# Patient Record
(demographics unavailable — no encounter records)

---

## 2024-12-13 NOTE — RISK ASSESSMENT
[No, patient denies ideation or behavior] : No, patient denies ideation or behavior [No] : No [FreeTextEntry8] : No past attempts or SI.

## 2024-12-13 NOTE — PLAN
[FreeTextEntry2] : Problem 1 Reduce anxious mood due to adjustments\par  Goal 1 Identify 2-3 skills that help manage anxiety and increase confidence of being able to manage emotions from 2 to 7.5 on scale (10=being most confident).\par  \par  Problem 2 Improve interpersonal relationships given themes from previous trauma such a trust, intimacy and self-esteem\par  Goal 2 Identify 2-3 skills to improve ability to communicate needs in relationship that she uses to elicit self-confidence and improve intimacy and trust.  [Cognitive and/or Behavior Therapy] : Cognitive and/or Behavior Therapy  [de-identified] : Session focused on assessing symptoms and treatment plan. Client noted worry, anxiety and stress related to recent relationship and transition to her job which has brings up themes related to trust and past trauma. Client processed thoughts and feelings related to stressors. She shared how she has used skills from previous sessions but struggles making decisions about her relationship. Socratic questions were used in session to think about aspects of leaving or staying in the relationship. She agreed to practice skills from previous sessions to manage stress.  Agreed to meet following holiday or sooner depending on patients and writer's schedule.  [Recommended Frequency of Visits: ____] : Recommended frequency of visits: [unfilled] [Return in ____ week(s)] : Return in [unfilled] week(s) [FreeTextEntry1] : Continue with treatment plan and appointments. Will discuss frequency in next session.

## 2024-12-13 NOTE — REASON FOR VISIT
[Patient preference] : as per patient preference [Other Location: e.g. Home (Enter Location, City,State)___] : The provider was located at [unfilled]. [Home] : The patient, [unfilled], was located at home, [unfilled], at the time of the visit. [Verbal consent obtained from patient/other participant(s)] : Verbal consent for telehealth/telephonic services obtained from patient/other participant(s) [FreeTextEntry4] : 9:05 [FreeTextEntry5] : 10:00 [FreeTextEntry1] : Continue with treatment plan.

## 2024-12-18 NOTE — END OF VISIT
[Duration of Psychotherapy Visit (minutes spent in synchronous communication): ____] : Duration of Psychotherapy Visit (minutes spent in synchronous communication): [unfilled] [Teletherapy Service Provided] : The services provided in this session were delivered via tele-therapy [Individual Psychotherapy for 16-37 minutes] : Individual Psychotherapy for 16-37 minutes

## 2024-12-18 NOTE — PLAN
[Cognitive and/or Behavior Therapy] : Cognitive and/or Behavior Therapy  [Recommended Frequency of Visits: ____] : Recommended frequency of visits: [unfilled] [Return in ____ week(s)] : Return in [unfilled] week(s) [FreeTextEntry2] : Problem 1 Reduce anxious mood due to adjustments\par  Goal 1 Identify 2-3 skills that help manage anxiety and increase confidence of being able to manage emotions from 2 to 7.5 on scale (10=being most confident).\par  \par  Problem 2 Improve interpersonal relationships given themes from previous trauma such a trust, intimacy and self-esteem\par  Goal 2 Identify 2-3 skills to improve ability to communicate needs in relationship that she uses to elicit self-confidence and improve intimacy and trust.  [de-identified] : Session focused on assessing symptoms and treatment plan. Client noted continued worry, anxiety and stress related to recent relationship that brings up themes related to trust and past trauma. Client processed thoughts and feelings related to the relationship and decisions about staying in the relationship and moving in. Ways to set boundaries and communicate were reviewed to help if she decides to stay in the relationship. Socratic questions were used in session to think about aspects of leaving or staying in the relationship. She agreed to practice skills from previous sessions to manage stress.  Agreed to meet following the holiday as discussed in last session. [FreeTextEntry1] : Continue with treatment plan and appointments. Will discuss frequency in next session.

## 2024-12-18 NOTE — REASON FOR VISIT
[Patient preference] : as per patient preference [Other Location: e.g. Home (Enter Location, City,State)___] : The provider was located at [unfilled]. [Home] : The patient, [unfilled], was located at home, [unfilled], at the time of the visit. [Verbal consent obtained from patient/other participant(s)] : Verbal consent for telehealth/telephonic services obtained from patient/other participant(s) [FreeTextEntry4] : 12:30 [FreeTextEntry5] : 1:00 [FreeTextEntry1] : Continue with treatment plan.

## 2025-01-03 NOTE — END OF VISIT
[Duration of Psychotherapy Visit (minutes spent in synchronous communication): ____] : Duration of Psychotherapy Visit (minutes spent in synchronous communication): [unfilled] [Individual Psychotherapy for 16-37 minutes] : Individual Psychotherapy for 16-37 minutes [Teletherapy Service Provided] : The services provided in this session were delivered via tele-therapy [Individual Psychotherapy for 53+ minutes] : Individual Psychotherapy for 53+ minutes

## 2025-01-03 NOTE — PLAN
[Cognitive and/or Behavior Therapy] : Cognitive and/or Behavior Therapy  [Recommended Frequency of Visits: ____] : Recommended frequency of visits: [unfilled] [Return in ____ week(s)] : Return in [unfilled] week(s) [FreeTextEntry2] : Problem 1 Reduce anxious mood due to adjustments\par  Goal 1 Identify 2-3 skills that help manage anxiety and increase confidence of being able to manage emotions from 2 to 7.5 on scale (10=being most confident).\par  \par  Problem 2 Improve interpersonal relationships given themes from previous trauma such a trust, intimacy and self-esteem\par  Goal 2 Identify 2-3 skills to improve ability to communicate needs in relationship that she uses to elicit self-confidence and improve intimacy and trust.  [de-identified] : Session focused on assessing symptoms and treatment plan. Client noted worried thoughts, anxiety and stress related to recent relationship that brings up themes related to trust and past trauma. Client reported she broke up with her partner and was glad given his behaviors and attitude which conflicted with hers. Client processed thoughts and feelings related to the relationship and decisions. Positive feedback and validation were provided.  Cognitive flexablity were discussied in session for her to practice to manage current stress and mood. Examples were provided in session that she can use over the weekend. Socratic questions were used in session to problem solve and increase self-confidence. She agreed to practice skills from previous sessions to manage stress.  [FreeTextEntry1] : Continue with treatment plan and appointments.

## 2025-01-03 NOTE — PLAN
[Cognitive and/or Behavior Therapy] : Cognitive and/or Behavior Therapy  [Recommended Frequency of Visits: ____] : Recommended frequency of visits: [unfilled] [Return in ____ week(s)] : Return in [unfilled] week(s) [FreeTextEntry2] : Problem 1 Reduce anxious mood due to adjustments\par  Goal 1 Identify 2-3 skills that help manage anxiety and increase confidence of being able to manage emotions from 2 to 7.5 on scale (10=being most confident).\par  \par  Problem 2 Improve interpersonal relationships given themes from previous trauma such a trust, intimacy and self-esteem\par  Goal 2 Identify 2-3 skills to improve ability to communicate needs in relationship that she uses to elicit self-confidence and improve intimacy and trust.  [de-identified] : Session focused on assessing symptoms and treatment plan. Client noted worried thoughts, anxiety and stress related to recent relationship that brings up themes related to trust and past trauma. Client reported she broke up with her partner and was glad given his behaviors and attitude which conflicted with hers. Client processed thoughts and feelings related to the relationship and decisions. Positive feedback and validation were provided.  Cognitive flexablity were discussied in session for her to practice to manage current stress and mood. Examples were provided in session that she can use over the weekend. Socratic questions were used in session to problem solve and increase self-confidence. She agreed to practice skills from previous sessions to manage stress.  [FreeTextEntry1] : Continue with treatment plan and appointments.

## 2025-01-22 NOTE — ASSESSMENT
[FreeTextEntry1] : HCM  Labs discussed with pt today  low chol diet, exercise  Gyn due  Support offered  f/u prn or 1 year.

## 2025-01-22 NOTE — HEALTH RISK ASSESSMENT
[Good] : ~his/her~  mood as  good [Yes] : Yes [Monthly or less (1 pt)] : Monthly or less (1 point) [No] : In the past 12 months have you used drugs other than those required for medical reasons? No [No falls in past year] : Patient reported no falls in the past year [I have developed a follow-up plan documented below in the note.] : I have developed a follow-up plan documented below in the note. [Never] : Never [None] : None [With Family] : lives with family [Feels Safe at Home] : Feels safe at home [Fully functional (bathing, dressing, toileting, transferring, walking, feeding)] : Fully functional (bathing, dressing, toileting, transferring, walking, feeding) [Fully functional (using the telephone, shopping, preparing meals, housekeeping, doing laundry, using] : Fully functional and needs no help or supervision to perform IADLs (using the telephone, shopping, preparing meals, housekeeping, doing laundry, using transportation, managing medications and managing finances) [Smoke Detector] : smoke detector [Carbon Monoxide Detector] : carbon monoxide detector [Seat Belt] :  uses seat belt [de-identified] : active [de-identified] : regular [Reports changes in hearing] : Reports no changes in hearing [Reports changes in vision] : Reports no changes in vision [Reports changes in dental health] : Reports no changes in dental health

## 2025-01-22 NOTE — HISTORY OF PRESENT ILLNESS
[FreeTextEntry1] : Annual Physical [de-identified] : EFRAIN KAUR is a 33 yo woman with mild asthma here for a physical. She has been well overall.  Asthma sometimes acts up with URIs or change in weather. No symptoms currently on singulair and albuterol prn.  Gyn due.  Anxiety better with talk therapy  The patient is single with no children.  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  She would have no difficulty walking 4 to 6 blocks or 2 flights of stairs.  Exercising regularly.

## 2025-01-27 NOTE — HISTORY OF PRESENT ILLNESS
[FreeTextEntry1] : 33 y/o LMP 3 weeks ago presents for well woman visit. Reports she had intercourse with someone who later admitted he had HSV2, patient denies sxs or genital ulcers. Otherwise feels well with no concerns, no changes in med/surgical history since last visit. Would like to go back on OCPs. Has trips to [place] and [place] coming up

## 2025-01-27 NOTE — PHYSICAL EXAM
[Chaperone Present] : A chaperone was present in the examining room during all aspects of the physical examination [82644] : A chaperone was present during the pelvic exam. [FreeTextEntry2] : Daily HELLER [Appropriately responsive] : appropriately responsive [Alert] : alert [No Acute Distress] : no acute distress [Regular Rate Rhythm] : regular rate rhythm [Soft] : soft [Non-tender] : non-tender [Non-distended] : non-distended [No Mass] : no mass [Oriented x3] : oriented x3 [FreeTextEntry5] : non labored breathing [Examination Of The Breasts] : a normal appearance [No Masses] : no breast masses were palpable [Labia Majora] : normal [Labia Minora] : normal [Normal] : normal [Uterine Adnexae] : normal

## 2025-01-27 NOTE — PLAN
[FreeTextEntry1] : 33 y/o LMP 3 weeks ago for well woman visit  Plan: - Pap test today - discussed HSV serology has high false positives, precautions given to monitor for sxs, come to office if any genital lesions - GC/CT, STI screening - RTO for annual or PRN

## 2025-03-27 NOTE — PHYSICAL EXAM
[FreeTextEntry3] : PE:  General: well-appearing, alert, in no acute distress  Full body skin exam performed examining scalp, head, face, ears, eyes, mouth, neck, chest, back, abdomen, axilla, b/l arms, b/l forearms, b/l hands, b/l fingernails, b/l thighs, b/l legs. Feet, buttocks and genitalia deferred per patient. Pertinent findings include: - face, trunk, and extremities with scattered brown macules and papules. dermoscopy with benign features - trunk and extremities with scattered regular red papules - scalp with  mild scale

## 2025-03-27 NOTE — ASSESSMENT
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 Diagnosis reviewed - c/w Ketoconazole 2% shampoo three times a week to the scalp. Okay to alternate with Neutragena T-Sal Shampoo or Head and Shoulders/Selsun blue. If not covered, can use otc Nizoral shampoo - restart mometasone solution daily to AA x 2 weeks, then as needed, SED, do not get on face.  #Cherry angiomas #Multiple melanocytic nevi, benign - education, counseling, reassurance - ABCDEs of melanoma reviewed, rtc sooner if changing  FBSE/Healthcare maintenance -Sun protection was discussed. The proper use of broad-spectrum UVA/UVB sunscreens with SPF 30 or greater was reviewed and the need for re-application after swimming or sweating or 2-3 hours was emphasized. We talked about judicious use of clothing and avoidance of peak periods of sun exposure. I made the patient aware of the need for year-round protection. ABCDEs of melanoma were also reviewed. -Self-skin checks were also reviewed, rtc sooner if changed noted -Counseled patient to monitor for changes - FBSE completed today, no lesions concerning for malignancy. Next FBSE due 1 year

## 2025-03-27 NOTE — HISTORY OF PRESENT ILLNESS
[FreeTextEntry1] : RPV- spots [de-identified] : Mar 27 2025  9:00AM   34 year F patient last seen 2.5 years ago, here for eval of moles on skin, none changing, growing. Wears sunscreen.  Also gets flaking and irritation on scalp, used topicals in past that helped, but flares off of it.   All: NKDA No personal or family hx of skin cancer

## 2025-04-09 NOTE — PLAN
[Cognitive and/or Behavior Therapy] : Cognitive and/or Behavior Therapy  [Recommended Frequency of Visits: ____] : Recommended frequency of visits: [unfilled] [Return in ____ week(s)] : Return in [unfilled] week(s) [FreeTextEntry2] : Problem 1 Reduce anxious mood due to adjustments\par  Goal 1 Identify 2-3 skills that help manage anxiety and increase confidence of being able to manage emotions from 2 to 7.5 on scale (10=being most confident).\par  \par  Problem 2 Improve interpersonal relationships given themes from previous trauma such a trust, intimacy and self-esteem\par  Goal 2 Identify 2-3 skills to improve ability to communicate needs in relationship that she uses to elicit self-confidence and improve intimacy and trust.  [de-identified] : Session focused on assessing symptoms and treatment plan. Client noted increase in stress and worried thoughts related to work related stress. Client noted thoughts and feelings related to work related stress and moral injury. Client provided examples of how she has begun shifting work culture and setting boundaries. Positive feedback was provided. Continue ways to advocate and set boundaries were reviewed. Mindfulness of present moment skill was taught to use to reduce distress at work. Client continues to use skills from past session which has been helpful and will continue.   [FreeTextEntry1] : Continue with treatment plan and appointments.

## 2025-04-09 NOTE — REASON FOR VISIT
[Patient preference] : as per patient preference [Other Location: e.g. Home (Enter Location, City,State)___] : The provider was located at [unfilled]. [Home] : The patient, [unfilled], was located at home, [unfilled], at the time of the visit. [Verbal consent obtained from patient/other participant(s)] : Verbal consent for telehealth/telephonic services obtained from patient/other participant(s) [Patient] : Patient [FreeTextEntry4] : 12:30 [FreeTextEntry5] : 1:00 [FreeTextEntry1] : Continue with treatment plan.

## 2025-04-22 NOTE — PLAN
[FreeTextEntry2] : Problem 1 Reduce anxious mood due to adjustments\par  Goal 1 Identify 2-3 skills that help manage anxiety and increase confidence of being able to manage emotions from 2 to 7.5 on scale (10=being most confident).\par  \par  Problem 2 Improve interpersonal relationships given themes from previous trauma such a trust, intimacy and self-esteem\par  Goal 2 Identify 2-3 skills to improve ability to communicate needs in relationship that she uses to elicit self-confidence and improve intimacy and trust.  [Cognitive and/or Behavior Therapy] : Cognitive and/or Behavior Therapy  [de-identified] : Session focused on assessing symptoms and treatment plan. Client noted stress and sadness as it related to a recent loss in her family, as well as conflict with a close friend and family member.  Client noted thoughts and feelings related to her loss and reminders of past trauma. Client discussed ways she has been critical of herself and how to manage some conflict. Ways to express her concern and emotions at time that works best for her was reviewed along with reducing self-criticism. Mindfulness of present moment skill was reviewed to use to reduce distress at work. Client continues to use skills from past session which has been helpful and will continue.   [Recommended Frequency of Visits: ____] : Recommended frequency of visits: [unfilled] [Return in ____ week(s)] : Return in [unfilled] week(s) [FreeTextEntry1] : Continue with treatment plan and appointments.

## 2025-04-22 NOTE — REASON FOR VISIT
[Patient preference] : as per patient preference [Other Location: e.g. Home (Enter Location, City,State)___] : The provider was located at [unfilled]. [Home] : The patient, [unfilled], was located at home, [unfilled], at the time of the visit. [Verbal consent obtained from patient/other participant(s)] : Verbal consent for telehealth/telephonic services obtained from patient/other participant(s) [FreeTextEntry4] : 9:05 [FreeTextEntry5] : 10:00 [Patient] : Patient [FreeTextEntry1] : Continue with treatment plan.

## 2025-07-17 NOTE — DISCUSSION/SUMMARY
[FreeTextEntry1] : Writer left a voice message to schedule and the last communication with [facility] was mid-June 2025 but appointment was not schedule. Await to hear from Taylor. If writer does not hear from her a 10 letter will be sent.